# Patient Record
Sex: MALE | Race: WHITE | NOT HISPANIC OR LATINO | Employment: PART TIME | URBAN - METROPOLITAN AREA
[De-identification: names, ages, dates, MRNs, and addresses within clinical notes are randomized per-mention and may not be internally consistent; named-entity substitution may affect disease eponyms.]

---

## 2024-05-07 ENCOUNTER — TELEPHONE (OUTPATIENT)
Dept: INTERNAL MEDICINE | Facility: CLINIC | Age: 24
End: 2024-05-07

## 2024-05-07 NOTE — TELEPHONE ENCOUNTER
Caller: GEETA PRECIADO    Relationship: Mother    Best call back number:[phone]    Who is your current provider  N/A    Is your current provider offboarding? NO    Who would you like your new provider to be: DR WALTER    What are your reasons for transferring care: NO LONGER SEES PEDIATRICIAN     Additional notes: : [facility]. STATES DR WALTER ATTENDED SAME Jew AS FAMILY. OKAY TO ACCEPT AS NEW PATIENT

## 2024-08-02 ENCOUNTER — OFFICE VISIT (OUTPATIENT)
Dept: FAMILY MEDICINE CLINIC | Facility: CLINIC | Age: 24
End: 2024-08-02
Payer: COMMERCIAL

## 2024-08-02 VITALS
DIASTOLIC BLOOD PRESSURE: 90 MMHG | OXYGEN SATURATION: 97 % | HEIGHT: 68 IN | HEART RATE: 80 BPM | SYSTOLIC BLOOD PRESSURE: 120 MMHG | WEIGHT: 165 LBS | TEMPERATURE: 97.7 F | BODY MASS INDEX: 25.01 KG/M2

## 2024-08-02 DIAGNOSIS — F41.9 ANXIETY: ICD-10-CM

## 2024-08-02 DIAGNOSIS — Z00.00 ANNUAL PHYSICAL EXAM: Primary | ICD-10-CM

## 2024-08-02 PROCEDURE — 99385 PREV VISIT NEW AGE 18-39: CPT | Performed by: FAMILY MEDICINE

## 2024-08-02 RX ORDER — PROPRANOLOL HYDROCHLORIDE 20 MG/1
20 TABLET ORAL DAILY
Qty: 90 TABLET | Refills: 3 | Status: SHIPPED | OUTPATIENT
Start: 2024-08-02

## 2024-08-02 RX ORDER — PROPRANOLOL HYDROCHLORIDE 20 MG/1
TABLET ORAL AS NEEDED
COMMUNITY
Start: 2024-05-24 | End: 2024-08-02 | Stop reason: SDUPTHER

## 2024-08-02 NOTE — PROGRESS NOTES
"  Subjective   Mitchel Rinaldi is a 24 y.o. male who is here for   Chief Complaint   Patient presents with    [facility]    Anxiety   .     History of Present Illness   History of Present Illness  Patient presents to the office for annual physical and for management of anxiety.  Patient denies any acute issues at this time.  Patient is not exercising regularly.  Patient does have longstanding history of anxiety that is related to certain situations.  He has been taking propranolol which has helped tremendously with his anxiety.    Review of Systems   Constitutional:  Negative for chills and fever.   Respiratory:  Negative for cough and shortness of breath.    Cardiovascular:  Negative for chest pain and leg swelling.   Skin:  Negative for pallor and rash.       Objective   Vitals:    08/02/24 1502   BP: 120/90   Pulse: 80   Temp: 97.7 °F (36.5 °C)   SpO2: 97%   Weight: 74.8 kg (165 lb)   Height: 173 cm (68.11\")      Physical Exam  Vitals and nursing note reviewed.   Constitutional:       Appearance: Normal appearance. He is normal weight.   HENT:      Head: Normocephalic and atraumatic.   Cardiovascular:      Rate and Rhythm: Normal rate and regular rhythm.      Pulses: Normal pulses.      Heart sounds: No murmur heard.  Pulmonary:      Effort: Pulmonary effort is normal. No respiratory distress.      Breath sounds: Normal breath sounds. No wheezing.   Skin:     General: Skin is warm and dry.   Neurological:      General: No focal deficit present.      Mental Status: He is alert.   Psychiatric:         Mood and Affect: Mood normal.         Thought Content: Thought content normal.       Physical Exam        Assessment & Plan   Assessment & Plan    Diagnoses and all orders for this visit:    1. Annual physical exam (Primary)  Will obtain baseline labs. Reviewed health maintenance.   -     TSH Rfx On Abnormal To Free T4; Future  -     CBC & Differential; Future  -     Lipid Panel; Future  -     Comprehensive " Metabolic Panel; Future    2. Anxiety  Continue propranolol 20 mg po daily.   -     propranolol (INDERAL) 20 MG tablet; Take 1 tablet by mouth Daily.  Dispense: 90 tablet; Refill: 3      Results      There are no Patient Instructions on file for this visit.    Medications Discontinued During This Encounter   Medication Reason    propranolol (INDERAL) 20 MG tablet Reorder        Return in about 1 year (around 8/2/2025) for Annual physical.  BMI is >= 25 and <30. (Overweight) The following options were offered after discussion;: exercise counseling/recommendations and nutrition counseling/recommendations          Arnoldo Pham MD  Family Practice  [place], [place]